# Patient Record
Sex: FEMALE | Race: BLACK OR AFRICAN AMERICAN | NOT HISPANIC OR LATINO | URBAN - METROPOLITAN AREA
[De-identification: names, ages, dates, MRNs, and addresses within clinical notes are randomized per-mention and may not be internally consistent; named-entity substitution may affect disease eponyms.]

---

## 2023-05-04 ENCOUNTER — HOSPITAL ENCOUNTER (OUTPATIENT)
Dept: DATA CONVERSION | Facility: HOSPITAL | Age: 58
End: 2023-05-04
Attending: OTOLARYNGOLOGY | Admitting: OTOLARYNGOLOGY

## 2023-05-04 ENCOUNTER — APPOINTMENT (OUTPATIENT)
Dept: LAB | Facility: LAB | Age: 58
End: 2023-05-04
Payer: COMMERCIAL

## 2023-05-04 DIAGNOSIS — K21.9 GASTRO-ESOPHAGEAL REFLUX DISEASE WITHOUT ESOPHAGITIS: ICD-10-CM

## 2023-05-04 DIAGNOSIS — Z79.51 LONG TERM (CURRENT) USE OF INHALED STEROIDS: ICD-10-CM

## 2023-05-04 DIAGNOSIS — J33.8 OTHER POLYP OF SINUS: ICD-10-CM

## 2023-05-04 DIAGNOSIS — J34.89 OTHER SPECIFIED DISORDERS OF NOSE AND NASAL SINUSES: ICD-10-CM

## 2023-05-04 DIAGNOSIS — J33.9 NASAL POLYP, UNSPECIFIED: ICD-10-CM

## 2023-05-04 DIAGNOSIS — J32.4 CHRONIC PANSINUSITIS: ICD-10-CM

## 2023-05-04 DIAGNOSIS — Z87.891 PERSONAL HISTORY OF NICOTINE DEPENDENCE: ICD-10-CM

## 2023-05-04 DIAGNOSIS — J43.9 EMPHYSEMA, UNSPECIFIED (MULTI): ICD-10-CM

## 2023-05-04 DIAGNOSIS — J34.3 HYPERTROPHY OF NASAL TURBINATES: ICD-10-CM

## 2023-05-04 DIAGNOSIS — I10 ESSENTIAL (PRIMARY) HYPERTENSION: ICD-10-CM

## 2023-05-04 DIAGNOSIS — J34.2 DEVIATED NASAL SEPTUM: ICD-10-CM

## 2023-05-04 LAB

## 2023-06-08 LAB
COMPLETE PATHOLOGY REPORT: NORMAL
CONVERTED CLINICAL DIAGNOSIS-HISTORY: NORMAL
CONVERTED FINAL DIAGNOSIS: NORMAL
CONVERTED FINAL REPORT PDF LINK TO COPY AND PASTE: NORMAL
CONVERTED GROSS DESCRIPTION: NORMAL

## 2023-09-07 VITALS
DIASTOLIC BLOOD PRESSURE: 72 MMHG | HEART RATE: 56 BPM | RESPIRATION RATE: 16 BRPM | TEMPERATURE: 98.4 F | SYSTOLIC BLOOD PRESSURE: 140 MMHG

## 2023-09-14 NOTE — H&P
History of Present Illness:   History Present Illness:  Reason for surgery: chronic sinusitis with nasal  polyps, deviated nasal septum   HPI:    pt had failed maximal medical therapy and is ready for surgery    Allergies:        Allergies:  ·  hydrocodone : Unknown  ·  ibuprofen : Unknown    Home Medication Review:   Home Medications Reviewed: yes     Impression/Procedure:   ·  Impression and Planned Procedure: chronic pansinusitis, nasal polyps  -To OR for ESS       ERAS (Enhanced Recovery After Surgery):  ·  ERAS Patient: no       Vital Signs:  Temperature C: 36.9 degrees C   Temperature F: 98.4 degrees F   Heart Rate: 56 beats per minute   Respiratory Rate: 16 breath per minute   Blood Pressure Systolic: 140 mm/Hg   Blood Pressure Diastolic: 72 mm/Hg     Physical Exam by System:    Respiratory/Thorax: Normal chest expansion, no stridor  or stertor   Cardiovascular: good peripheral perfusion, no cyanosis,  clubbing or edema     Consent:   COVID-19 Consent:  ·  COVID-19 Risk Consent Surgeon has reviewed key risks related to the risk of kirby COVID-19 and if they contract COVID-19 what the risks are.       Electronic Signatures:  Ward Escobedo)  (Signed 04-May-2023 11:07)   Authored: History of Present Illness, Allergies, Home  Medication Review, Impression/Procedure, ERAS, Physical Exam, Consent, Note Completion      Last Updated: 04-May-2023 11:07 by Ward Escobedo)

## 2023-10-02 PROBLEM — K21.9 GASTROESOPHAGEAL REFLUX DISEASE: Status: ACTIVE | Noted: 2022-07-24

## 2023-10-02 PROBLEM — J45.909 ASTHMA (HHS-HCC): Status: ACTIVE | Noted: 2023-10-02

## 2023-10-02 PROBLEM — J32.4 CHRONIC PANSINUSITIS: Status: ACTIVE | Noted: 2023-10-02

## 2023-10-02 PROBLEM — J34.2 DEVIATED NASAL SEPTUM: Status: ACTIVE | Noted: 2023-10-02

## 2023-10-02 PROBLEM — J34.89 NASAL OBSTRUCTION: Status: ACTIVE | Noted: 2023-10-02

## 2023-10-02 PROBLEM — J44.1 ASTHMA WITH COPD WITH EXACERBATION (MULTI): Status: ACTIVE | Noted: 2020-06-25

## 2023-10-02 PROBLEM — J34.3 HYPERTROPHY OF BOTH INFERIOR NASAL TURBINATES: Status: ACTIVE | Noted: 2023-10-02

## 2023-10-02 PROBLEM — I10 ESSENTIAL HYPERTENSION: Status: ACTIVE | Noted: 2019-05-19

## 2023-10-02 PROBLEM — J45.901 ASTHMA WITH COPD WITH EXACERBATION (MULTI): Status: ACTIVE | Noted: 2020-06-25

## 2023-10-02 PROBLEM — J35.2 ADENOID HYPERTROPHY: Status: ACTIVE | Noted: 2023-10-02

## 2023-10-02 PROBLEM — J33.9 NASAL POLYPS: Status: ACTIVE | Noted: 2023-10-02

## 2023-10-02 PROBLEM — J98.59 MEDIASTINAL MASS: Status: ACTIVE | Noted: 2020-06-27

## 2023-10-02 PROBLEM — F41.9 ANXIETY: Status: ACTIVE | Noted: 2019-07-09

## 2023-10-02 PROBLEM — R43.8 HYPOSMIA: Status: ACTIVE | Noted: 2023-10-02

## 2023-10-02 PROBLEM — R91.8 PULMONARY NODULES: Status: ACTIVE | Noted: 2022-07-24

## 2023-10-02 RX ORDER — DICYCLOMINE HYDROCHLORIDE 20 MG/1
TABLET ORAL
COMMUNITY
Start: 2019-02-08

## 2023-10-02 RX ORDER — AMLODIPINE BESYLATE 2.5 MG/1
2.5 TABLET ORAL
COMMUNITY
Start: 2022-11-25

## 2023-10-02 RX ORDER — BENZONATATE 100 MG/1
1 CAPSULE ORAL 3 TIMES DAILY PRN
COMMUNITY
Start: 2022-05-16

## 2023-10-02 RX ORDER — TRAMADOL HYDROCHLORIDE 50 MG/1
TABLET ORAL
COMMUNITY
Start: 2018-05-03

## 2023-10-02 RX ORDER — METOPROLOL TARTRATE 25 MG/1
25 TABLET, FILM COATED ORAL 2 TIMES DAILY
COMMUNITY
Start: 2022-06-21

## 2023-10-02 RX ORDER — FLUTICASONE PROPIONATE 50 MCG
1 SPRAY, SUSPENSION (ML) NASAL 2 TIMES DAILY
COMMUNITY
Start: 2022-10-06

## 2023-10-02 RX ORDER — BUDESONIDE AND FORMOTEROL FUMARATE DIHYDRATE 160; 4.5 UG/1; UG/1
AEROSOL RESPIRATORY (INHALATION)
COMMUNITY
Start: 2018-11-05

## 2023-10-02 RX ORDER — BUDESONIDE 0.5 MG/2ML
INHALANT ORAL
COMMUNITY
Start: 2018-10-12 | End: 2024-02-09

## 2023-10-02 RX ORDER — FLUCONAZOLE 150 MG/1
TABLET ORAL
COMMUNITY
Start: 2018-11-15

## 2023-10-02 RX ORDER — FAMOTIDINE 20 MG/1
20 TABLET, FILM COATED ORAL 2 TIMES DAILY
COMMUNITY
Start: 2018-10-29

## 2023-10-02 RX ORDER — FLUTICASONE PROPIONATE 93 UG/1
SPRAY, METERED NASAL
COMMUNITY
Start: 2020-04-16

## 2023-10-02 RX ORDER — FLUTICASONE PROPIONATE AND SALMETEROL XINAFOATE 115; 21 UG/1; UG/1
AEROSOL, METERED RESPIRATORY (INHALATION)
COMMUNITY
Start: 2017-06-01

## 2023-10-02 RX ORDER — ALBUTEROL SULFATE 0.83 MG/ML
2.5 SOLUTION RESPIRATORY (INHALATION) EVERY 6 HOURS PRN
COMMUNITY
Start: 2022-03-24 | End: 2023-11-17

## 2023-10-02 RX ORDER — ALBUTEROL SULFATE 90 UG/1
2 AEROSOL, METERED RESPIRATORY (INHALATION) EVERY 4 HOURS PRN
COMMUNITY
Start: 2017-09-29

## 2023-10-02 RX ORDER — NITROFURANTOIN 25; 75 MG/1; MG/1
CAPSULE ORAL
COMMUNITY
Start: 2018-10-26

## 2023-10-02 RX ORDER — ACETAMINOPHEN 325 MG/1
650 TABLET ORAL EVERY 4 HOURS PRN
COMMUNITY
Start: 2019-08-28

## 2023-10-02 RX ORDER — LIDOCAINE HYDROCHLORIDE 20 MG/ML
SOLUTION OROPHARYNGEAL
COMMUNITY
Start: 2018-05-03

## 2023-10-02 RX ORDER — PANTOPRAZOLE SODIUM 40 MG/1
1 TABLET, DELAYED RELEASE ORAL
COMMUNITY
Start: 2022-09-06

## 2023-10-02 RX ORDER — METOCLOPRAMIDE 10 MG/1
TABLET ORAL
COMMUNITY
Start: 2019-02-18

## 2023-10-02 RX ORDER — PROMETHAZINE HYDROCHLORIDE 12.5 MG/1
SUPPOSITORY RECTAL
COMMUNITY
Start: 2018-10-27

## 2023-10-02 RX ORDER — FLUTICASONE FUROATE AND VILANTEROL 100; 25 UG/1; UG/1
1 POWDER RESPIRATORY (INHALATION)
COMMUNITY
Start: 2023-04-21

## 2023-10-02 RX ORDER — TIOTROPIUM BROMIDE INHALATION SPRAY 3.12 UG/1
SPRAY, METERED RESPIRATORY (INHALATION)
COMMUNITY
Start: 2018-12-30

## 2023-10-02 RX ORDER — PROMETHAZINE HYDROCHLORIDE 25 MG/1
TABLET ORAL
COMMUNITY
Start: 2018-10-27

## 2023-10-02 RX ORDER — FLUTICASONE FUROATE AND VILANTEROL TRIFENATATE 200; 25 UG/1; UG/1
POWDER RESPIRATORY (INHALATION)
COMMUNITY
Start: 2023-04-11

## 2023-10-02 RX ORDER — GABAPENTIN 300 MG/1
300 CAPSULE ORAL NIGHTLY
COMMUNITY
Start: 2019-02-23

## 2023-10-02 RX ORDER — LORATADINE 10 MG/1
10 TABLET ORAL DAILY PRN
COMMUNITY
Start: 2013-06-14

## 2023-10-02 RX ORDER — BISACODYL 5 MG
TABLET, DELAYED RELEASE (ENTERIC COATED) ORAL
COMMUNITY
Start: 2018-12-14

## 2023-10-02 RX ORDER — SUCRALFATE 1 G/1
TABLET ORAL
COMMUNITY
Start: 2022-12-28

## 2023-10-02 RX ORDER — OMEPRAZOLE 40 MG/1
CAPSULE, DELAYED RELEASE ORAL
COMMUNITY
Start: 2019-02-25

## 2023-10-02 RX ORDER — LOSARTAN POTASSIUM 100 MG/1
1 TABLET ORAL DAILY
COMMUNITY
Start: 2023-02-17

## 2023-10-02 RX ORDER — POLYETHYLENE GLYCOL 3350 17 G/17G
POWDER, FOR SOLUTION ORAL
COMMUNITY
Start: 2020-04-02

## 2023-10-02 RX ORDER — MELOXICAM 7.5 MG/1
7.5 TABLET ORAL 2 TIMES DAILY
COMMUNITY
Start: 2012-01-12

## 2023-10-02 RX ORDER — LISINOPRIL 10 MG/1
10 TABLET ORAL DAILY
COMMUNITY
Start: 2019-02-24

## 2023-10-02 RX ORDER — MONTELUKAST SODIUM 10 MG/1
1 TABLET ORAL NIGHTLY
COMMUNITY
Start: 2018-08-24

## 2023-10-02 RX ORDER — DOCUSATE SODIUM 100 MG/1
100 CAPSULE, LIQUID FILLED ORAL EVERY 12 HOURS PRN
COMMUNITY
Start: 2020-04-02

## 2023-10-02 RX ORDER — ONDANSETRON 4 MG/1
TABLET, ORALLY DISINTEGRATING ORAL
COMMUNITY
Start: 2019-02-14

## 2023-10-02 NOTE — OP NOTE
PROCEDURE DETAILS    Preoperative Diagnosis:  Pansinusitis, J32.4  Deviated nasal septum, J34.2  Hypertrophy of nasal turbinates, J34.3  Nasal obstruction  Nasal sinus polyps    Postoperative Diagnosis:  Pansinusitis, J32.4  Deviated nasal septum, J34.2  Hypertrophy of nasal turbinates, J34.3    Surgeon: Ward Escobedo  Resident/Fellow/Other Assistant: None of these were associated with this case    Procedure:  1.  Bilateral endoscopic sinus surgery, septoplasty, inferior turbinate submucous resections with IGS     Anesthesia: Margot Ferrara  Estimated Blood Loss: 150  Findings: Extensive polypoid disease, see operative report for full detail  Specimens(s) Collected: yes,     Complications: none        Operative Report:   Date of service: 5/4/2023  Site of Service: ####    Preoperative Diagnosis:   1. Bilateral chronic pansinusitis with nasal polyposis  2. Nasal septal deviation  3. Bilateral inferior turbinate hypertrophy   4. Nasal obstruction and drainage    Postoperative Diagnoses:   1. Bilateral chronic pansinusitis with nasal polyposis  2. Nasal septal deviation  3. Bilateral inferior turbinate hypertrophy   4. Nasal obstruction and drainage    Operation/Procedure(s):   1. Bilateral revision endoscopic total ethmoidectomies with sphenoidotomies with removal of tissue from sinuses  2. Bilateral revision endoscopic frontal sinusotomies with frontal sinus explorations  3. Bilateral revision endoscopic maxillary antrostomies with removal of tissue from sinuses   4. Septoplasty  5. Bilateral inferior turbinate submucous resection  6. Imaged guidance navigation - extradural    Surgeon: Ward Escobedo MD    Assistant(s): None    EBL: 150 ml    Anesthesia: General and local     Operative Findings:  1. Chronic inflammation through all sinuses with polypoid disease  2. Armando Splint sutured to the septum bilaterally  3. Propel mini stents placed in the frontal sinuses  4. Absorbable hemostatic material in the  ethmoids    Operative Indications:   The patient is a 58 year old female with a history of chronic rhinosinusitis - pansinusitis with nasal polyposis, deviated nasal septum and inferior turbinate hypertrophy. She had previously undergone a sinus surgery 5 years ago but was unfortunately  lost to follow up and was non-compliant with post operative care. She developed a recurrence of her polyps that then failed maximal medical therapy. The patient was treated with maximal medical therapy and a post-treatment CT scan showed persistent disease.  Therefore, the risks, benefits, and alternatives of the above procedures were discussed and the patient agreed to proceed. Please see the ambulatory EMR for full documentation and detail of this discussion.    Operative/Procedure Narrative:   The patient was brought into the operating room and laid in a supine position on the operating room table. A surgical huddle was conducted to verify the patient and the procedure to be performed. General anesthesia was induced and the patient's airway  was secured with an ET tube. A time out was called. The nasal cavities were sprayed with 0.5% Afrin. The right and left nasal cavities were then injected with lidocaine 1% with 1:100,000 epinephrine. Next, image guidance was attached and registered. The  image guidance was used through the procedure for identification of critical landmarks. Once the image guidance was calibrated, the nasal cavities were examined with a 0 degree endoscope.    The patient was noted to have enlarged inferior turbinates on the right and left, a broad septal deviation to the right.   Using a zero degree endoscope for visualization and a [place] elevator on the left there was noted to be sharee polyposis in the nasal  cavity which was resected with a microdebrider. We then used a backbiting forceps and cutting forceps to remove polyps and create a large revision maxillary antrostomy. The edges were cleaned up with the  microdebrider. The maxillary sinus was entered  and suctioned and tissue was removed. Attention was then turned to the ethmoid air cells. The residual ethmoid air cells and polyps were resected with cutting instruments and the microdebrider. The location of the skull base and lamina papyracea was periodically  confirmed with the surgical navigation. Posteriorly, the superior turbinate was identified and the sphenoid ostium was cannulated using the probe. The mushroom punch was used to complete a revision sphenoidotomy. Tissue was then removed from the sphenoid  sinus using the microdebrider. We then proceeded in a posterior to anterior fashion dissecting remnant ethmoid air cells off the skull base and orbit to complete a total ethmoidectomy. We then switched to a 70 degree scope and visualized the frontal outflow  tract. Using a frontal sinus probe, the frontal os was identified. A frontal sinusotomy was created using a Hosemann punch, giraffe forceps and a microdebrider. Tissue was removed from within the frontal sinus.    Attention was then turned towards the septoplasty. A #15 blade was used to make a Modified Jose incision at the squamocolumnar junction on the left. A Ayan elevator was used to elevate a mucoperichondrial flap on the left septum. An incision was  made through the cartilage using the suction elevator then the opposing septal flap was elevated. Using a Lucio forceps the intervening septal cartilage was removed, taking care to leave an approximately 2-cm dorsal and caudal cartilaginous strut  for support. There were no mucosal tears so a posterior control hole was created. The septum was then noted to be straight and the airway improved on both sides. The incision was closed using interrupted 4-0 plain gut sutures.     The right nasal cavity was then examined with the 0 degree endoscope. Again, polyps were removed from the nose. The probe was then used to cannulate the maxillary os and a  large revision maxillary antrostomy was made with a microdebrider and straight  through cutting forceps. The edges were cleaned up with the microdebrider. The maxillary sinus was entered and suctioned and tissue was removed. Attention was then turned to the ethmoid air cells. The residual ethmoid air cells and polyps were resected  with cutting instruments and the microdebrider. The location of the skull base and lamina papyracea was periodically confirmed with the surgical navigation. Posteriorly, the superior turbinate was identified and the sphenoid ostium was cannulated using  the probe. The mushroom punch was used to complete a revision sphenoidotomy. Tissue was then removed from the sphenoid sinus using the microdebrider. We then proceeded in a posterior to anterior fashion dissecting remnant ethmoid air cells off the skull  base and orbit to complete a total ethmoidectomy. We then switched to a 70 degree scope and visualized the frontal outflow tract. Using a frontal sinus probe, the frontal os was identified. A frontal sinusotomy was created using a Hosemann punch, giraffe  forceps and a microdebrider. Tissue was removed from within the frontal sinus.    The right and left inferior turbinates were then infiltrated with 1% lidocaine with epinephrine. A stab incision was then made in the head of the left inferior turbinate and the right inferior turbinate. A Ayan elevator was used to elevate the mucous  membrane off the inferior conchal bone on both sides and the microdebrider inferior turbinate blade attachment was then used to perform a submucous resection of tissue in the right and then the left inferior turbinates. The turbinates were then outfractured  laterally with a Boies elevator. The nasopharynx was then suctioned and the nose was copiously irrigated with normal saline. It was inspected for any bleeding and none was noted. Propel Mini stents were placed bilaterally. Absorbable hemostatic material   was placed in the bilateral ethmoid cavities.  Armando splints were placed and secured to the septum with a prolene suture. This completed the surgical procedure. An OG tube was passed to suction out gastric contents. The patient was turned over to the  anesthesia team for awakening and extubation. They were transferred to the PACU in stable condition.     Implants/Packing: Bilateral absorbable hemostatic material, propel mini stents and armando splints sutured to septum    Specimens:   Bilateral sinonasal contents to pathology     Complications:   None    Attestation: I, Ward Escobedo, was present for and completed all key portions of the procedure. There was no qualified resident available to assist  me.                        Attestation:   Note Completion:  Attending Attestation I performed the procedure without a resident         Electronic Signatures:  Ward Escobedo)  (Signed 04-May-2023 17:37)   Authored: Post-Operative Note, Chart Review, Note Completion      Last Updated: 04-May-2023 17:37 by Ward Escobedo)

## 2023-11-29 ENCOUNTER — APPOINTMENT (OUTPATIENT)
Dept: ALLERGY | Facility: HOSPITAL | Age: 58
End: 2023-11-29
Payer: COMMERCIAL

## 2024-02-07 NOTE — PROGRESS NOTES
HPI   5/24/23 - The patient notes that her smell has still not returned yet. She is doing budesonide irrigations daily.  2/9/24 - The patient reports doing well with the sinuses/nose. Some of her taste has returned. She is performing budesonide irrigations once daily and does occasional saline irrigations. She denies any new sinus infections.    Operative Report:  Date of service: 5/4/2023     Postoperative Diagnoses:  1. Bilateral chronic pansinusitis with nasal polyposis  2. Nasal septal deviation  3. Bilateral inferior turbinate hypertrophy  4. Nasal obstruction and drainage     Operation/Procedures:  1. Bilateral revision endoscopic total ethmoidectomies with sphenoidotomies with removal of tissue from sinuses  2. Bilateral revision endoscopic frontal sinusotomies with frontal sinus explorations  3. Bilateral revision endoscopic maxillary antrostomies with removal of tissue from sinuses  4. Septoplasty  5. Bilateral inferior turbinate submucous resection  6. Imaged guidance navigation - extradural     Operative Findings:  1. Chronic inflammation through all sinuses with polypoid disease   2. Armando Splint sutured to the septum bilaterally   3. Propel mini stents placed in the frontal sinuses   4. Absorbable hemostatic material in the ethmoids    Surgeon: Ward Escobedo MD  Assistant: None  EBL: 150 ml  Anesthesia: General and local     Per prior note:  53 year old female h/o CRS with nasal polyposis s/p bilateral endoscopic sinus surgery, ITR on 5/2/18. Last seen 6/15/18 at which point had been non-compliant with post-op budesonide rinses and exam showed acute infection with edematous tissue around the frontal recesses. Has been on daily budesonide/mupirocin irrigations and states has been following the instructions and using daily. Finished oral antibiotics and prednisone. States her smell/taste improves for a few days on oral steroids but then dissipates. Feels better overall: less sniffling, less gunk in eyes.  "Still has some yellowish crusts that come out of her nose. Cultures from that visit did not grow a specific bacterium.     Per prior note (6/15/18):  Jeanna Kaye is a 53 year old female h/o CRS s/p FESS (total) ITR presenting for post operative follow up appointment. The patient had surgery 5/2/18. They report acute concerns of \"the polyps are back\". She believes this to be true because she started having similar symptoms of nasal drainage, obstruction and blowing out \"chunks\" two weeks ago. She is very upset about this. She was not able to attend her last visit and has not been doing irrigations with budesonide. She says they didn't have the medication at pharmacy. She reports expected post operative congestion and edema. The patient has been using saline irrigations daily which have been productive of crusts and dried/old blood. They deny excessive bleeding, constant clear fluid from nose, severe headaches, double vision, or fevers above 102.    Review of Systems   Negative for constitutional, eyes, cardiac, pulmonary, hepatic, renal, digestive, hematologic, epileptic, syncopal, musculoskeletal, mental health, integumentary, hypertensive, lipid, arthritic, diabetic, thyroid or neurologic disorders (except as listed in the HPI, PMH and Problem List).       Assessment   Jeanna Kaye is a 59 y.o. female h/o CRS with NP, DNS, ITH, nasal obstruction/drainage now s/p bilateral revision FESS (total), septoplasty, and ITR on 5/4/2023. Previously underwent FESS (total) and ITR on 5/2/2018.  5/12/23 - Expected post op swelling today. Debridement performed as above. Rx budesonide irrigations. Continue saline irrigations.  5/24/23 - Yellow drainage, less granulation and patent frontals. Out of abudance of caution will Rx Bactrim x 2 weeks, Rx Culturelle. Continue budesonide irrigations BID.  2/9/24 - Symptoms of mucous improved from last visit. Still mild polypoid edema in bilateral frontals but improved. Continue " budesonide irrigations once daily, Rx refilled today. Rx Flonase, 2 sprays BID.    Plan   Nasal endoscopy. Findings: as noted.  Patient was provided a budesonide prescription refill. Continue budesonide irrigations once daily.  Patient was prescribed Flonase to use at the opposite time of day as the budesonide irrigation, 2 sprays in each nostril twice daily.  Continue saline irrigations as needed.  Reassurance provided to patient, the nose is healing and edema at this point is expected.  Follow up in 6 month.     Ward Escobedo MD [facility]  Division of Rhinology, Sinus, and Skull Base Surgery       Exam   General: This is a healthy appearing female who appears stated age. The patient is alert and appropriately verbally conversant without hoarseness.  Face: The face was inspected and no cutaneous masses or lesions were visualized. There was no erythema or edema noted. Facial movement was symmetric without weakness. No skin lesions were detected.   Eyes: Extra-ocular muscle function was intact. No nystagmus was observed. Pupils were equal.      Nose: Examination of the nose revealed the nasal dorsum to be midline. Intranasal exam reveals the septum is healthy without perforation. The inferior turbinates were expectedly edematous. Crusts and dried secretions noted on anterior rhinoscopy. See below procedure note as applicable for further exam.     Procedure: Nasal endoscopy - diagnostic  Indication: Chronic rhinosinusitis, post operative from sinus surgery  Informed consent obtained: risks, benefits, alternatives, and expectations discussed with patient and the patient wishes to proceed.      Findings: After anesthesia and decongestion with topical lidocaine and Afrin spray, the nasal cavities were examined with a zero and/or 30-degree endoscope. Nasal cavity is clear inferiorly. Sphenoidotomies are clear. The maxillary and ethmoid sinuses are widely patent. The frontal recesses were with mild polypoid edema. The frontal  sinusotomies were narrowed but patent. No pus or polyps were noted. There is no CSF leak. The patient tolerated the procedure well and there were no complications.       Scribe Attestation  By signing my name below, I, Elizabeth Maradiaga, attest that this documentation has been prepared under the direction and in the presence of Ward Escobedo MD. All medical record entries made by the Scribe were at my direction or personally dictated by me. I have reviewed the chart and agree that the record accurately reflects my personal performance of the history, physical exam, discussion and plan.

## 2024-02-07 NOTE — PATIENT INSTRUCTIONS
PATIENT INSTRUCTIONS ARE COMPLETE and READY TO PRINT     Budesonide Irrigations and Flonase:  Please continue doing budesonide irrigations once daily. I provided you a budesonide refill today.  To prepare each budesonide irrigation: Add 1 vial of budesonide in 8 ounces of saline solution. Swirl gently. Use half of the mixture (4 ounces) in each nostril.  Continue saline irrigations as needed.  Please start using Flonase (fluticasone). Do 2 sprays in each nostril twice daily. Use Flonase at the opposite time of day as your budesonide irrigation. Be sure to aim the Flonase spray slightly outwards toward the corner of the eye on the same side nostril.    Recipe to Make your Own Nasal Saline Solution:  Mix 8 ounces of distilled water or tap water (be sure to boil tap water, then let it cool down) with 1/2 teaspoon of baking soda and 1/2 teaspoon of table salt and shake bottle to dissolve.     Please follow up with me in 6 months for reevaluation or sooner with any questions or concerns. Please feel free to contact my office at [phone] with any questions.    Scribe Attestation  By signing my name below, I, Elizabeth Maradiaga, attest that this documentation has been prepared under the direction and in the presence of Ward Escobedo MD. All medical record entries made by the Scribe were at my direction or personally dictated by me. I have reviewed the chart and agree that the record accurately reflects my personal performance of the history, physical exam, discussion and plan.

## 2024-02-09 ENCOUNTER — OFFICE VISIT (OUTPATIENT)
Dept: OTOLARYNGOLOGY | Facility: CLINIC | Age: 59
End: 2024-02-09
Payer: COMMERCIAL

## 2024-02-09 VITALS — BODY MASS INDEX: 23.74 KG/M2 | WEIGHT: 149.3 LBS | TEMPERATURE: 96.4 F

## 2024-02-09 DIAGNOSIS — J33.8 POLYP OF NASAL SINUS: ICD-10-CM

## 2024-02-09 DIAGNOSIS — J45.909 ASTHMA, UNSPECIFIED ASTHMA SEVERITY, UNSPECIFIED WHETHER COMPLICATED, UNSPECIFIED WHETHER PERSISTENT (HHS-HCC): ICD-10-CM

## 2024-02-09 DIAGNOSIS — J32.4 CHRONIC PANSINUSITIS: Primary | ICD-10-CM

## 2024-02-09 PROCEDURE — 1036F TOBACCO NON-USER: CPT | Performed by: OTOLARYNGOLOGY

## 2024-02-09 PROCEDURE — 99214 OFFICE O/P EST MOD 30 MIN: CPT | Performed by: OTOLARYNGOLOGY

## 2024-02-09 PROCEDURE — 31231 NASAL ENDOSCOPY DX: CPT | Performed by: OTOLARYNGOLOGY

## 2024-02-09 RX ORDER — BUDESONIDE 0.5 MG/2ML
INHALANT ORAL
Qty: 60 ML | Refills: 11 | Status: SHIPPED | OUTPATIENT
Start: 2024-02-09

## 2024-02-09 RX ORDER — BUDESONIDE 0.5 MG/2ML
INHALANT ORAL
Qty: 60 ML | Refills: 11 | Status: SHIPPED | OUTPATIENT
Start: 2024-02-09 | End: 2024-02-09

## 2024-02-09 ASSESSMENT — ENCOUNTER SYMPTOMS: DEPRESSION: 0

## 2024-02-09 NOTE — PROGRESS NOTES
Nasal irrigations ordered. Order placed to patient pharmacy for Budesonide 0.5mg/2ml  twice a day per nasal irrigation for 30 days with 11 refills. Patient notified compounding pharmacy will call patient for further instruction and to obtain additional information. Patient instructed to call with any further questions.

## 2025-02-28 ENCOUNTER — APPOINTMENT (OUTPATIENT)
Dept: OTOLARYNGOLOGY | Facility: CLINIC | Age: 60
End: 2025-02-28
Payer: COMMERCIAL

## 2025-03-28 ENCOUNTER — APPOINTMENT (OUTPATIENT)
Dept: OTOLARYNGOLOGY | Facility: CLINIC | Age: 60
End: 2025-03-28
Payer: COMMERCIAL
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
## 2025-04-30 NOTE — PATIENT INSTRUCTIONS
PATIENT INSTRUCTIONS ARE COMPLETE and READY TO PRINT    Prednisone taper:  Please start a 12-day tapering course of prednisone. Take as directed. Please take the prednisone each day before noon. If you take the prednisone later in the day, it can cause difficulty sleeping.  The potential long-term side effects of oral steroid use include but are not limited to: difficulty sleeping/insomnia, increased appetite, fluid retention, mood swings, weight gain, change in blood pressure, high blood glucose, possible adrenal suppression, osteoporosis, avascular necrosis of the hip, glaucoma, and cataracts. If you notice bad hip pain while taking prednisone, contact our office at [phone] or send us a [employer] message.  If the itching returns after finishing prednisone, try Claritin.    Budesonide Irrigations:  Please do a budesonide irrigation once daily.  To prepare a budesonide irrigation: Always add 1 salt/baking soda packet to 8 ounces of distilled water, or use the saline recipe provided below. Shake to dissolve. After that, add 1 budesonide vial to the saline solution. Swirl the vial gently to avoid deactivating the budesonide. Use half of the mixture (4 ounces or 120 mL) in each nostril.  If you decide to microwave or otherwise heat up your nasal irrigation solution, be sure to only heat up the saline solution. Be sure to add the budesonide AFTER you have warmed the solution. Otherwise the heating process can deactivate the medicine. Follow all other instructions for the medicated saline irrigation. Heating up the irrigation solution is entirely optional if it improves your comfort.  Contact our office at [phone] or send us a [employer] message if you are have problems obtaining the budesonide or if it is too expensive through your regular pharmacy. We can instead prescribe it through a compounding pharmacy to reduce the cost significantly.  Continue Flonase (fluticasone) as needed. Be sure to aim the Flonase spray  slightly outwards toward the corner of the eye on the same side nostril.  Continue saline irrigations as needed.    Saline recipe:  Use distilled water. Prepare 8 ounces (or 240 mL) of distilled water with 1/2 teaspoon of baking soda and 1/2 teaspoon of table salt. Shake bottle to dissolve. Avoid using tap water. If you must use tap water, be sure to boil it, then let it cool down.    I provided you with a provider's card to get an ear cleaning.    Follow up:  Please follow up with me in 3 months for reevaluation. If you have questions, feel free to contact my office at [phone] or send us a [employer] message.    Scribe Attestation  By signing my name below, I, Elizabeth Maradiaga, attest that this documentation has been prepared under the direction and in the presence of Ward Escobedo MD.

## 2025-05-01 ENCOUNTER — OFFICE VISIT (OUTPATIENT)
Dept: OTOLARYNGOLOGY | Facility: CLINIC | Age: 60
End: 2025-05-01
Payer: COMMERCIAL

## 2025-05-01 DIAGNOSIS — J33.8 POLYP OF NASAL SINUS: ICD-10-CM

## 2025-05-01 DIAGNOSIS — H61.23 BILATERAL IMPACTED CERUMEN: ICD-10-CM

## 2025-05-01 DIAGNOSIS — J32.4 CHRONIC PANSINUSITIS: Primary | ICD-10-CM

## 2025-05-01 PROCEDURE — 31231 NASAL ENDOSCOPY DX: CPT | Performed by: OTOLARYNGOLOGY

## 2025-05-01 PROCEDURE — 1036F TOBACCO NON-USER: CPT | Performed by: OTOLARYNGOLOGY

## 2025-05-01 PROCEDURE — 99214 OFFICE O/P EST MOD 30 MIN: CPT | Performed by: OTOLARYNGOLOGY

## 2025-05-01 RX ORDER — PREDNISONE 10 MG/1
TABLET ORAL
Qty: 30 TABLET | Refills: 0 | Status: SHIPPED | OUTPATIENT
Start: 2025-05-01

## 2025-05-01 ASSESSMENT — PATIENT HEALTH QUESTIONNAIRE - PHQ9
2. FEELING DOWN, DEPRESSED OR HOPELESS: NOT AT ALL
SUM OF ALL RESPONSES TO PHQ9 QUESTIONS 1 & 2: 0
1. LITTLE INTEREST OR PLEASURE IN DOING THINGS: NOT AT ALL

## 2025-06-16 DIAGNOSIS — J45.909 ASTHMA, UNSPECIFIED ASTHMA SEVERITY, UNSPECIFIED WHETHER COMPLICATED, UNSPECIFIED WHETHER PERSISTENT (HHS-HCC): ICD-10-CM

## 2025-06-16 RX ORDER — BUDESONIDE 0.5 MG/2ML
INHALANT ORAL
Qty: 60 ML | Refills: 11 | Status: SHIPPED | OUTPATIENT
Start: 2025-06-16

## 2025-06-16 NOTE — TELEPHONE ENCOUNTER
Patient contacted the office today requesting a refill for her budesonide. Refill sent to patients pharmacy on file per Dr. Escobedo.

## 2025-07-10 ENCOUNTER — APPOINTMENT (OUTPATIENT)
Dept: OTOLARYNGOLOGY | Facility: CLINIC | Age: 60
End: 2025-07-10
Payer: COMMERCIAL

## 2025-07-10 VITALS — HEIGHT: 67 IN | BODY MASS INDEX: 21.66 KG/M2 | TEMPERATURE: 98.4 F | WEIGHT: 138 LBS

## 2025-07-10 DIAGNOSIS — H61.23 BILATERAL IMPACTED CERUMEN: Primary | ICD-10-CM

## 2025-07-10 DIAGNOSIS — L29.9 EAR ITCHING: ICD-10-CM

## 2025-07-10 PROCEDURE — 1036F TOBACCO NON-USER: CPT | Performed by: NURSE PRACTITIONER

## 2025-07-10 PROCEDURE — 99213 OFFICE O/P EST LOW 20 MIN: CPT | Performed by: NURSE PRACTITIONER

## 2025-07-10 PROCEDURE — 3008F BODY MASS INDEX DOCD: CPT | Performed by: NURSE PRACTITIONER

## 2025-07-10 ASSESSMENT — PAIN SCALES - GENERAL: PAINLEVEL_OUTOF10: 4

## 2025-07-10 ASSESSMENT — PATIENT HEALTH QUESTIONNAIRE - PHQ9
SUM OF ALL RESPONSES TO PHQ9 QUESTIONS 1 & 2: 0
1. LITTLE INTEREST OR PLEASURE IN DOING THINGS: NOT AT ALL
2. FEELING DOWN, DEPRESSED OR HOPELESS: NOT AT ALL

## 2025-07-10 NOTE — PROGRESS NOTES
Subjective   Patient ID: Jeanna Kaye is a 60 y.o. female who presents for Follow-up (Possible nasal polyps-pain level 4).  HPI  This patient was referred by rhinology for cerumen removal.  Patient endorses right greater than left EAC itching.  She denies any otalgia, otorrhea.  She does not wear hearing amplification.  Review of Systems  A comprehensive or 10 points review of the patient's constitutional, neurological, HEENT, pulmonary, cardiovascular and genito-urinary systems showed only those mentioned in history of present illness.    Objective   Physical Exam  Constitutional: no fever, chills, weight loss or weight gain   General appearance: Appears well, well-nourished, well groomed. No acute distress.   Communication: Normal communication   Psychiatric: Oriented to person, place and time. Normal mood and affect.   Neurologic: Cranial nerves II-XII grossly intact and symmetric bilaterally.   Head and Face:   Head: Atraumatic with no masses, lesions or scarring.   Face: Normal symmetry, no paralysis, synkinesis or facial tic. No scars or deformities.     Eyes: Conjunctiva not edematous or erythematous   Ears: External inspection of ears with no deformity, scars or masses.  Bilateral canals with cerumen impactions     Neck: Normal appearing, symmetric, trachea midline.   Cardiovascular: Examination of peripheral vascular system shows no clubbing or cyanosis.   Respiratory: No respiratory distress increased work of breathing. Inspection of the chest with symmetric chest expansion and normal respiratory effort.   Skin: No rashes in the head or neck    Assessment/Plan     This patient presents for subsequent evaluation of acute acquired bilateral cerumen impaction and bilateral EAC itching.    Reassurance given that otologic exam is normal after cleaning.  I recommend using oil-based drops twice per month to prevent dryness which should improve itching.  She may follow-up as needed.  All questions were  answered to patient's satisfaction.    This note was created using speech recognition transcription software. Despite proofreading, several typographical errors might be present that might affect the meaning of the content. Please call with any questions.  Patient ID: Jeanna Kaye is a 60 y.o. female.    Ear cerumen removal    Date/Time: 7/10/2025 1:37 PM    Performed by: LESLY Ko  Authorized by: LESLY Ko    Consent:     Consent obtained:  Verbal    Consent given by:  Patient    Risks discussed:  Pain    Alternatives discussed:  No treatment  Procedure details:     Location:  L ear and R ear    Procedure type comment:  Suction and alligator    Procedure outcomes: cerumen removed    Post-procedure details:     Inspection:  No bleeding, ear canal clear and TM intact    Hearing quality:  Improved    Procedure completion:  Tolerated well, no immediate complications         LESLY Ko 07/10/25 1:36 PM

## 2025-08-08 ENCOUNTER — APPOINTMENT (OUTPATIENT)
Dept: OTOLARYNGOLOGY | Facility: CLINIC | Age: 60
End: 2025-08-08
Payer: COMMERCIAL

## 2025-09-04 ENCOUNTER — APPOINTMENT (OUTPATIENT)
Dept: OTOLARYNGOLOGY | Facility: CLINIC | Age: 60
End: 2025-09-04
Payer: COMMERCIAL